# Patient Record
Sex: FEMALE | Race: WHITE
[De-identification: names, ages, dates, MRNs, and addresses within clinical notes are randomized per-mention and may not be internally consistent; named-entity substitution may affect disease eponyms.]

---

## 2022-02-18 ENCOUNTER — RESULT REVIEW (OUTPATIENT)
Age: 47
End: 2022-02-18

## 2022-10-28 ENCOUNTER — NON-APPOINTMENT (OUTPATIENT)
Age: 47
End: 2022-10-28

## 2022-11-03 ENCOUNTER — APPOINTMENT (OUTPATIENT)
Dept: OBGYN | Facility: CLINIC | Age: 47
End: 2022-11-03

## 2022-11-03 VITALS
WEIGHT: 152 LBS | HEART RATE: 83 BPM | DIASTOLIC BLOOD PRESSURE: 80 MMHG | HEIGHT: 67 IN | OXYGEN SATURATION: 98 % | BODY MASS INDEX: 23.86 KG/M2 | SYSTOLIC BLOOD PRESSURE: 115 MMHG

## 2022-11-03 DIAGNOSIS — D21.9 BENIGN NEOPLASM OF CONNECTIVE AND OTHER SOFT TISSUE, UNSPECIFIED: ICD-10-CM

## 2022-11-03 DIAGNOSIS — Z82.49 FAMILY HISTORY OF ISCHEMIC HEART DISEASE AND OTHER DISEASES OF THE CIRCULATORY SYSTEM: ICD-10-CM

## 2022-11-03 DIAGNOSIS — D64.9 ANEMIA, UNSPECIFIED: ICD-10-CM

## 2022-11-03 DIAGNOSIS — Z86.59 PERSONAL HISTORY OF OTHER MENTAL AND BEHAVIORAL DISORDERS: ICD-10-CM

## 2022-11-03 DIAGNOSIS — Z83.438 FAMILY HISTORY OF OTHER DISORDER OF LIPOPROTEIN METABOLISM AND OTHER LIPIDEMIA: ICD-10-CM

## 2022-11-03 DIAGNOSIS — R35.0 FREQUENCY OF MICTURITION: ICD-10-CM

## 2022-11-03 DIAGNOSIS — N85.2 HYPERTROPHY OF UTERUS: ICD-10-CM

## 2022-11-03 DIAGNOSIS — Z78.9 OTHER SPECIFIED HEALTH STATUS: ICD-10-CM

## 2022-11-03 DIAGNOSIS — N92.0 EXCESSIVE AND FREQUENT MENSTRUATION WITH REGULAR CYCLE: ICD-10-CM

## 2022-11-03 PROBLEM — Z00.00 ENCOUNTER FOR PREVENTIVE HEALTH EXAMINATION: Status: ACTIVE | Noted: 2022-11-03

## 2022-11-03 PROCEDURE — 99205 OFFICE O/P NEW HI 60 MIN: CPT

## 2022-11-03 NOTE — HISTORY OF PRESENT ILLNESS
[Patient reported PAP Smear was normal] : Patient reported PAP Smear was normal [Normal Amount/Duration] :  normal amount and duration [Frequency: Q ___ days] : menstrual periods occur approximately every [unfilled] days [Menarche Age: ____] : age at menarche was [unfilled] [Currently Active] : currently active [Men] : men [No] : No [Y] : Positive pregnancy history [PapSmeardate] : 2022 [PGxTotal] : 1 [PGHxAbortions] : 1 [[facility] MaryvalexLiving] : 0 [FreeTextEntry3] : IUD [FreeTextEntry1] : 10/12/2022

## 2022-11-03 NOTE — CONSULT LETTER
[Dear  ___] : Dear  [unfilled], [Consult Letter:] : I had the pleasure of evaluating your patient, [unfilled]. [Please see my note below.] : Please see my note below. [Consult Closing:] : Thank you very much for allowing me to participate in the care of this patient.  If you have any questions, please do not hesitate to contact me. [Sincerely,] : Sincerely, [FreeTextEntry1] : \par  [FreeTextEntry3] : .Jigar Fenton MD, FACOG, FACS \par \par Minimally Invasive Gynecologic Surgery \par \par [facility] Physician [facility] \par [address]\[place] [address] \par Tel: [phone] \par Fax: [phone]

## 2022-11-03 NOTE — REVIEW OF SYSTEMS
[Patient Intake Form Reviewed] : Patient intake form was reviewed [Anxiety] : anxiety [Depression] : depression [Negative] : Neurological

## 2022-11-03 NOTE — PHYSICAL EXAM
[Chaperone Present] : A chaperone was present in the examining room during all aspects of the physical examination [Appropriately responsive] : appropriately responsive [Alert] : alert [No Acute Distress] : no acute distress [Oriented x3] : oriented x3 [FreeTextEntry1] : Cristina Worthington

## 2022-11-03 NOTE — DISCUSSION/SUMMARY
[FreeTextEntry1] : 48 yo patient presents for consultation for laparoscopic hysterectomy .I sat down with the patient to discuss the imaging findings & her symptoms which warrant surgical intervention. I explained that the heavy bleeding and pain bleeding is likely related to myomata, possible adenomyosis and also enlarged uterus. Discussion about management options for heavy bleeding and pain including hormonal and non-hormonal medication, pain medication. Oral contraceptive pill, LNG IUD, NuvaRing and TXA. Procedure such as uterine artery embolization or radiofreqency ablation of myomata. Major surgical procedure including myomectomy and hysterectomy. We discussed type of hysterectomy including total and supracervical. We reviewed that there is no proven medical benefit to keeping the cervix, and removal in the future is more difficult.  I recommend definitive hysterectomy with bilateral salpingectomy via a minimally invasive approach.  Quicker recovery with supracervical hysterectomy, will need endometrial biopsy which is similar to IUD placement.  \par \par The options for surgical approach including open, vaginal, and laparoscopic with or without robotic assistance were discussed and the patient agrees with plan for laparoscopic hysterectomy with bilateral salpingectomy.  The differential diagnosis was discussed in detail. The indications, risks, benefits and alternatives were discussed. Including but not limited to conversion to laparotomy, bleeding, infection, injury to surrounding organs was discussed at length.  She understand that there is increased risk for bladder injury with prior  section. Chance of occult injury and need for future surgery. OJ VIEYRA expressed an understanding of the treatment rationale and her questions were answered to her apparent satisfaction.  She was given written information about postoperative care and diagrams of the pelvic anatomy. She will let us know how she would like to proceed.

## 2022-11-03 NOTE — COUNSELING
[Lab Results] : lab results [Pre/Post Op Instructions] : pre/post op instructions [Contraception/ Emergency Contraception/ Safe Sexual Practices] : contraception, emergency contraception, safe sexual practices

## 2022-11-04 ENCOUNTER — TRANSCRIPTION ENCOUNTER (OUTPATIENT)
Age: 47
End: 2022-11-04

## 2022-11-18 ENCOUNTER — LABORATORY RESULT (OUTPATIENT)
Age: 47
End: 2022-11-18

## 2022-11-18 ENCOUNTER — APPOINTMENT (OUTPATIENT)
Dept: INTERVENTIONAL RADIOLOGY/VASCULAR | Facility: HOSPITAL | Age: 47
End: 2022-11-18
Payer: COMMERCIAL

## 2022-11-18 DIAGNOSIS — D25.0 SUBMUCOUS LEIOMYOMA OF UTERUS: ICD-10-CM

## 2022-11-18 PROCEDURE — XXXXX: CPT | Mod: 1L

## 2022-11-21 VITALS
OXYGEN SATURATION: 99 % | SYSTOLIC BLOOD PRESSURE: 110 MMHG | RESPIRATION RATE: 16 BRPM | DIASTOLIC BLOOD PRESSURE: 70 MMHG | TEMPERATURE: 98 F | HEART RATE: 78 BPM

## 2022-11-21 PROBLEM — D25.0 FIBROIDS, SUBMUCOSAL: Status: ACTIVE | Noted: 2022-11-21

## 2022-11-21 RX ORDER — SERTRALINE HYDROCHLORIDE 150 MG/1
CAPSULE ORAL
Refills: 0 | Status: ACTIVE | COMMUNITY

## 2022-11-21 NOTE — HISTORY OF PRESENT ILLNESS
[Menorrhagia] : ~T menorrhagia [Urinary Frequency] : urinary frequency [Pressure] : pressure [Bloating] : bloating [Last Menstrual Period (___)] : Last menstrual period [unfilled] [Monthly Cycles Regular] : monthly cycles are regular [Menopausal Symptoms] : menopausal symptoms present [G ___] : G [unfilled] [P___] : P [unfilled] [A___] : A [unfilled] [Stable] : stable [FreeTextEntry1] : Mindy Jesus is a 47 year old  with symptomatic uterine fibroids diagnosed 15 years ago.  Menorrhagia began approximately 7 years ago periods lasting 5 days 3-4 very heavy.  Denies metrorrhagia or anemia.  Also reports pelvic discomfort, pressure and urinary frequency. Denies cramps.  Does report occasional possible pre menopausal symptoms including sweats. 10/4/2022 outside MRI shows multifibroid uterus [Bleeding In Between Periods] : no bleeding in between periods [Lesions/ Discharge] : no lesions and or discharge [Post Menopause] : not post menopausal [Plans for future pregnancies within 2 years] : does not plan to have future pregnancies within 2 years [Surgically Sterile] : not surgically sterile [Endometriosis] : no endometriosis [FreeTextEntry2] : IUD

## 2022-11-21 NOTE — CONSULT LETTER
[Dear  ___] : Dear  [unfilled], [Consult Letter:] : I had the pleasure of evaluating your patient, [unfilled]. [Please see my note below.] : Please see my note below. [Consult Closing:] : Thank you very much for allowing me to participate in the care of this patient.  If you have any questions, please do not hesitate to contact me. [Sincerely,] : Sincerely, [FreeTextEntry3] : Kvng Morrow MD, FSIR\par Chief Interventional Radiology\par [facility]\par [facility]\par

## 2022-11-21 NOTE — PHYSICAL EXAM
[Alert] : alert [No Acute Distress] : no acute distress [Well Nourished] : well nourished [Well Developed] : well developed [Normal Sclera/Conjunctiva] : normal sclera/conjunctiva [EOMI] : extra occular movement intact [No Proptosis] : no proptosis [Normal Oropharynx] : the oropharynx was normal [No Neck Mass] : no neck mass was observed [Supple] : the neck was supple [Thyroid Not Enlarged] : the thyroid was not enlarged [No Respiratory Distress] : no respiratory distress [No Accessory Muscle Use] : no accessory muscle use [Clear to Auscultation] : lungs were clear to auscultation bilaterally [Normal Rate] : heart rate was normal  [Normal S1, S2] : normal S1 and S2 [Regular Rhythm] : with a regular rhythm [Femoral Arteries Normal] : femoral pulses were normal without bruits [No Edema] : there was no peripheral edema [Normal Bowel Sounds] : normal bowel sounds [Not Tender] : non-tender [Soft] : abdomen soft [Not Distended] : not distended [Normal Anterior Cervical Nodes] : anterior cervical nodes [No CVA Tenderness] : no ~M costovertebral angle tenderness [No Spinal Tenderness] : no spinal tenderness [Spine Straight] : spine straight [Normal Gait] : normal gait [No Involuntary Movements] : no involuntary movements were seen [No Rash] : no rash [No Tremors] : no tremors [Oriented x3] : oriented to person, place, and time [Normal Insight/Judgement] : insight and judgment were intact [Normal Affect] : the affect was normal [Normal Mood] : the mood was normal [Fully active, able to carry on all pre-disease performance without restriction] : Fully active, able to carry on all pre-disease performance without restriction [Acanthosis Nigricans___] : no acanthosis nigricans [de-identified] : uterus palpable below umbilicus

## 2022-11-21 NOTE — ASSESSMENT
[FreeTextEntry1] : 47 year old with bulk and bleeding symptoms related to uterine fibroids.  Based on symptoms and MRI patient is a candidate for UAE.  A thorough explanation of the procedure including risks and benefits was provided and the patient had an opportunity to ask questions all of which were answered.  She would like to proceed with the procedure which has been scheduled.

## 2022-12-05 ENCOUNTER — APPOINTMENT (OUTPATIENT)
Dept: INTERVENTIONAL RADIOLOGY/VASCULAR | Facility: HOSPITAL | Age: 47
End: 2022-12-05

## 2022-12-09 ENCOUNTER — OUTPATIENT (OUTPATIENT)
Dept: OUTPATIENT SERVICES | Facility: HOSPITAL | Age: 47
LOS: 1 days | End: 2022-12-09
Payer: COMMERCIAL

## 2022-12-09 ENCOUNTER — APPOINTMENT (OUTPATIENT)
Dept: INTERVENTIONAL RADIOLOGY/VASCULAR | Facility: HOSPITAL | Age: 47
End: 2022-12-09

## 2022-12-09 ENCOUNTER — RESULT REVIEW (OUTPATIENT)
Age: 47
End: 2022-12-09

## 2022-12-09 PROCEDURE — C1887: CPT

## 2022-12-09 PROCEDURE — 36247 INS CATH ABD/L-EXT ART 3RD: CPT | Mod: 59

## 2022-12-09 PROCEDURE — C1769: CPT

## 2022-12-09 PROCEDURE — C1889: CPT

## 2022-12-09 PROCEDURE — 37243 VASC EMBOLIZE/OCCLUDE ORGAN: CPT

## 2022-12-09 PROCEDURE — C1894: CPT

## 2022-12-09 PROCEDURE — 36247 INS CATH ABD/L-EXT ART 3RD: CPT

## 2022-12-09 RX ORDER — ONDANSETRON 8 MG/1
1 TABLET, FILM COATED ORAL
Qty: 9 | Refills: 0
Start: 2022-12-09 | End: 2022-12-11

## 2022-12-09 RX ORDER — OXYCODONE HYDROCHLORIDE 5 MG/1
1 TABLET ORAL
Qty: 20 | Refills: 0
Start: 2022-12-09 | End: 2022-12-13

## 2022-12-09 RX ORDER — IBUPROFEN 200 MG
1 TABLET ORAL
Qty: 40 | Refills: 0
Start: 2022-12-09 | End: 2022-12-18

## 2022-12-13 ENCOUNTER — NON-APPOINTMENT (OUTPATIENT)
Age: 47
End: 2022-12-13

## 2023-01-13 ENCOUNTER — APPOINTMENT (OUTPATIENT)
Dept: INTERVENTIONAL RADIOLOGY/VASCULAR | Facility: HOSPITAL | Age: 48
End: 2023-01-13
Payer: COMMERCIAL

## 2023-01-13 PROCEDURE — XXXXX: CPT

## 2023-04-03 ENCOUNTER — APPOINTMENT (OUTPATIENT)
Dept: ULTRASOUND IMAGING | Facility: CLINIC | Age: 48
End: 2023-04-03

## 2023-04-07 ENCOUNTER — APPOINTMENT (OUTPATIENT)
Dept: MRI IMAGING | Facility: HOSPITAL | Age: 48
End: 2023-04-07

## 2023-04-07 ENCOUNTER — OUTPATIENT (OUTPATIENT)
Dept: OUTPATIENT SERVICES | Facility: HOSPITAL | Age: 48
LOS: 1 days | End: 2023-04-07
Payer: COMMERCIAL

## 2023-04-07 PROCEDURE — 72197 MRI PELVIS W/O & W/DYE: CPT | Mod: 26

## 2023-04-07 PROCEDURE — 72197 MRI PELVIS W/O & W/DYE: CPT

## 2023-04-07 PROCEDURE — A9585: CPT

## 2023-04-10 ENCOUNTER — APPOINTMENT (OUTPATIENT)
Dept: INTERVENTIONAL RADIOLOGY/VASCULAR | Facility: HOSPITAL | Age: 48
End: 2023-04-10
Payer: COMMERCIAL

## 2023-04-10 PROCEDURE — XXXXX: CPT

## 2023-04-10 NOTE — HISTORY OF PRESENT ILLNESS
[FreeTextEntry1] : Four months since UAE.  Has had all periods. Slightly less bleeding. Changing pads less likely. Marked improvement in abdominal distention, distention. Urinary symptoms have improved. Less urgency.No change in menopausal symptoms.Reviewed MRI  Most fibroids infarcted and decreased in size.  Very happy with the result.

## 2023-11-06 ENCOUNTER — APPOINTMENT (OUTPATIENT)
Dept: INTERVENTIONAL RADIOLOGY/VASCULAR | Facility: HOSPITAL | Age: 48
End: 2023-11-06

## 2023-11-06 PROCEDURE — XXXXX: CPT | Mod: 1L
